# Patient Record
Sex: MALE | Race: NATIVE HAWAIIAN OR OTHER PACIFIC ISLANDER | Employment: FULL TIME | URBAN - METROPOLITAN AREA
[De-identification: names, ages, dates, MRNs, and addresses within clinical notes are randomized per-mention and may not be internally consistent; named-entity substitution may affect disease eponyms.]

---

## 2017-01-04 ENCOUNTER — APPOINTMENT (OUTPATIENT)
Dept: LAB | Facility: HOSPITAL | Age: 37
End: 2017-01-04
Attending: INTERNAL MEDICINE
Payer: COMMERCIAL

## 2017-01-04 ENCOUNTER — HOSPITAL ENCOUNTER (OUTPATIENT)
Dept: ULTRASOUND IMAGING | Facility: HOSPITAL | Age: 37
Discharge: HOME | End: 2017-01-04
Attending: INTERNAL MEDICINE
Payer: COMMERCIAL

## 2017-01-04 VITALS
HEIGHT: 70.5 IN | HEART RATE: 80 BPM | SYSTOLIC BLOOD PRESSURE: 123 MMHG | RESPIRATION RATE: 16 BRPM | OXYGEN SATURATION: 97 % | TEMPERATURE: 98 F | DIASTOLIC BLOOD PRESSURE: 88 MMHG | BODY MASS INDEX: 31.57 KG/M2 | WEIGHT: 223 LBS

## 2017-01-04 DIAGNOSIS — K76.0 FATTY LIVER: Primary | ICD-10-CM

## 2017-01-04 DIAGNOSIS — K76.0 FATTY LIVER: ICD-10-CM

## 2017-01-04 LAB
ERYTHROCYTE [DISTWIDTH] IN BLOOD BY AUTOMATED COUNT: 13.2 % (ref 11.5–16)
HCT VFR BLD AUTO: 49.9 % (ref 37–53)
HGB BLD-MCNC: 17.3 G/DL (ref 13–17)
INR BLD: 0.86 (ref 0.89–1.12)
MCH RBC QN AUTO: 29.8 PG (ref 27–33.2)
MCHC RBC AUTO-ENTMCNC: 34.7 G/DL (ref 31–37)
MCV RBC AUTO: 85.9 FL (ref 80–99)
PLATELET # BLD AUTO: 279 10(3)UL (ref 150–450)
PSA SERPL DL<=0.01 NG/ML-MCNC: 12 SECONDS (ref 12.3–14.8)
RBC # BLD AUTO: 5.81 X10(6)UL (ref 4.3–5.7)
RED CELL DISTRIBUTION WIDTH-SD: 41.3 FL (ref 35.1–46.3)
WBC # BLD AUTO: 6.8 X10(3) UL (ref 4–13)

## 2017-01-04 PROCEDURE — 88313 SPECIAL STAINS GROUP 2: CPT | Performed by: INTERNAL MEDICINE

## 2017-01-04 PROCEDURE — 88307 TISSUE EXAM BY PATHOLOGIST: CPT | Performed by: INTERNAL MEDICINE

## 2017-01-04 PROCEDURE — 85027 COMPLETE CBC AUTOMATED: CPT | Performed by: RADIOLOGY

## 2017-01-04 PROCEDURE — 99152 MOD SED SAME PHYS/QHP 5/>YRS: CPT | Performed by: RADIOLOGY

## 2017-01-04 PROCEDURE — 76942 ECHO GUIDE FOR BIOPSY: CPT | Performed by: RADIOLOGY

## 2017-01-04 PROCEDURE — 85610 PROTHROMBIN TIME: CPT

## 2017-01-04 PROCEDURE — 36415 COLL VENOUS BLD VENIPUNCTURE: CPT | Performed by: RADIOLOGY

## 2017-01-04 PROCEDURE — 47000 NEEDLE BIOPSY OF LIVER PERQ: CPT | Performed by: RADIOLOGY

## 2017-01-04 RX ORDER — FLUMAZENIL 0.1 MG/ML
INJECTION, SOLUTION INTRAVENOUS
Status: DISCONTINUED
Start: 2017-01-04 | End: 2017-01-04 | Stop reason: WASHOUT

## 2017-01-04 RX ORDER — FLUMAZENIL 0.1 MG/ML
0.2 INJECTION, SOLUTION INTRAVENOUS AS NEEDED
Status: DISCONTINUED | OUTPATIENT
Start: 2017-01-04 | End: 2017-01-08

## 2017-01-04 RX ORDER — NALOXONE HYDROCHLORIDE 0.4 MG/ML
80 INJECTION, SOLUTION INTRAMUSCULAR; INTRAVENOUS; SUBCUTANEOUS AS NEEDED
Status: DISCONTINUED | OUTPATIENT
Start: 2017-01-04 | End: 2017-01-08

## 2017-01-04 RX ORDER — SODIUM CHLORIDE 9 MG/ML
INJECTION, SOLUTION INTRAVENOUS CONTINUOUS
Status: DISCONTINUED | OUTPATIENT
Start: 2017-01-04 | End: 2017-01-08

## 2017-01-04 RX ORDER — NALOXONE HYDROCHLORIDE 0.4 MG/ML
INJECTION, SOLUTION INTRAMUSCULAR; INTRAVENOUS; SUBCUTANEOUS
Status: DISCONTINUED
Start: 2017-01-04 | End: 2017-01-04 | Stop reason: WASHOUT

## 2017-01-04 RX ORDER — MIDAZOLAM HYDROCHLORIDE 1 MG/ML
1 INJECTION INTRAMUSCULAR; INTRAVENOUS EVERY 5 MIN PRN
Status: ACTIVE | OUTPATIENT
Start: 2017-01-04 | End: 2017-01-04

## 2017-01-04 RX ORDER — MIDAZOLAM HYDROCHLORIDE 1 MG/ML
INJECTION INTRAMUSCULAR; INTRAVENOUS
Status: COMPLETED
Start: 2017-01-04 | End: 2017-01-04

## 2017-01-04 RX ADMIN — SODIUM CHLORIDE: 9 INJECTION, SOLUTION INTRAVENOUS at 10:55:00

## 2017-01-04 RX ADMIN — MIDAZOLAM HYDROCHLORIDE 1 MG: 1 INJECTION INTRAMUSCULAR; INTRAVENOUS at 11:08:00

## 2017-01-04 NOTE — IMAGING NOTE
US guided liver biopsy with Dr. Anthony Schultz. Pt tolerated well. Vss. Right abdominal dressing dry/intact. Report to Kaleb Rapp, [address] [facility]. Transport to room 2257.

## 2017-01-04 NOTE — OR NURSING
Dr. Leon Taylor updated on patient status: vitals stable, no bleeding noted, patient denies pain and is tolerating PO fluids and solids. Per Dr. Leon Taylor, ok for discharge home at this time.

## 2017-01-23 NOTE — PROGRESS NOTES
Quick Note:    1/23/2017  [facility]    Dear Cameron Nelson,       Here are the biopsy/pathology findings from your liver biopsy: There is fatty liver, which was know about.   Luckily there is NO scarring or dam

## 2017-04-25 PROCEDURE — 82570 ASSAY OF URINE CREATININE: CPT | Performed by: INTERNAL MEDICINE

## 2017-04-25 PROCEDURE — 82043 UR ALBUMIN QUANTITATIVE: CPT | Performed by: INTERNAL MEDICINE

## 2018-03-02 PROBLEM — Z51.81 ENCOUNTER FOR THERAPEUTIC DRUG MONITORING: Status: ACTIVE | Noted: 2018-03-02

## 2018-03-02 PROBLEM — E66.9 OBESITY (BMI 30-39.9): Status: ACTIVE | Noted: 2018-03-02

## 2018-03-03 PROCEDURE — 82607 VITAMIN B-12: CPT | Performed by: INTERNAL MEDICINE

## 2018-03-03 PROCEDURE — 84402 ASSAY OF FREE TESTOSTERONE: CPT | Performed by: INTERNAL MEDICINE

## 2018-03-03 PROCEDURE — 83090 ASSAY OF HOMOCYSTEINE: CPT | Performed by: INTERNAL MEDICINE

## 2018-03-03 PROCEDURE — 82397 CHEMILUMINESCENT ASSAY: CPT | Performed by: INTERNAL MEDICINE

## 2018-03-03 PROCEDURE — 86141 C-REACTIVE PROTEIN HS: CPT | Performed by: INTERNAL MEDICINE

## 2018-03-03 PROCEDURE — 84403 ASSAY OF TOTAL TESTOSTERONE: CPT | Performed by: INTERNAL MEDICINE

## 2018-04-18 PROBLEM — Z51.81 ENCOUNTER FOR THERAPEUTIC DRUG MONITORING: Status: RESOLVED | Noted: 2018-03-02 | Resolved: 2018-04-18

## 2018-04-18 PROCEDURE — 86708 HEPATITIS A ANTIBODY: CPT | Performed by: INTERNAL MEDICINE

## 2018-04-18 PROCEDURE — 82570 ASSAY OF URINE CREATININE: CPT | Performed by: INTERNAL MEDICINE

## 2018-04-18 PROCEDURE — 86706 HEP B SURFACE ANTIBODY: CPT | Performed by: INTERNAL MEDICINE

## 2018-04-18 PROCEDURE — 86709 HEPATITIS A IGM ANTIBODY: CPT | Performed by: INTERNAL MEDICINE

## 2018-04-18 PROCEDURE — 82043 UR ALBUMIN QUANTITATIVE: CPT | Performed by: INTERNAL MEDICINE

## 2018-04-28 PROBLEM — R79.89 LOW VITAMIN D LEVEL: Status: ACTIVE | Noted: 2018-04-28

## 2018-04-28 PROBLEM — E78.5 HYPERLIPIDEMIA: Status: ACTIVE | Noted: 2018-04-28

## 2021-04-11 DIAGNOSIS — Z23 NEED FOR VACCINATION: ICD-10-CM

## (undated) NOTE — LETTER
1/23/2017          [address] [facility]    Dear Mary Matt,       Here are the biopsy/pathology findings from your liver biopsy: There is fatty liver, which was know about.   Luckily there is NO scarring or da

## (undated) NOTE — IP AVS SNAPSHOT
[facility] [place] [address] [place], [address] ~ [phone]                Discharge Summary   1/4/2017    [id number] [facility]           Admission Information        Provider Department    1/4/2017 Deonna Dickinson MD  Ultrasound      Al Dr. Daniel Henriquez    Have someone drive you home after your procedure. You may not drive yourself home    [address]    · Take things easy for the rest of the day after your biopsy.   You may be sore in the biopsy area for one to five days  · DO drink ple 0.53 (12/24/16)  7.8 (12/24/16)  3.7 -- -- --      Radiology Exams     None         Additional Information       We are concerned for your overall well being:    - If you are a smoker or have smoked in the last 12 months, we encourage you to explore option